# Patient Record
Sex: FEMALE | Race: WHITE | URBAN - METROPOLITAN AREA
[De-identification: names, ages, dates, MRNs, and addresses within clinical notes are randomized per-mention and may not be internally consistent; named-entity substitution may affect disease eponyms.]

---

## 2018-05-16 ENCOUNTER — HISTORICAL (OUTPATIENT)
Dept: ADMINISTRATIVE | Facility: HOSPITAL | Age: 58
End: 2018-05-16

## 2018-05-16 LAB
ABS NEUT (OLG): 6.65 X10(3)/MCL (ref 2.1–9.2)
ALBUMIN SERPL-MCNC: 3.6 GM/DL (ref 3.4–5)
ALBUMIN/GLOB SERPL: 1 RATIO (ref 1–2)
ALP SERPL-CCNC: 112 UNIT/L (ref 45–117)
ALT SERPL-CCNC: 22 UNIT/L (ref 12–78)
APPEARANCE, UA: CLEAR
AST SERPL-CCNC: 17 UNIT/L (ref 15–37)
BACTERIA #/AREA URNS AUTO: ABNORMAL /[HPF]
BASOPHILS # BLD AUTO: 0.05 X10(3)/MCL
BASOPHILS NFR BLD AUTO: 1 %
BILIRUB SERPL-MCNC: 1 MG/DL (ref 0.2–1)
BILIRUB UR QL STRIP: 0.5 MG/DL
BILIRUBIN DIRECT+TOT PNL SERPL-MCNC: 0.3 MG/DL
BILIRUBIN DIRECT+TOT PNL SERPL-MCNC: 0.7 MG/DL
BUN SERPL-MCNC: 16 MG/DL (ref 7–18)
CALCIUM SERPL-MCNC: 8.9 MG/DL (ref 8.5–10.1)
CHLORIDE SERPL-SCNC: 92 MMOL/L (ref 98–107)
CO2 SERPL-SCNC: 30 MMOL/L (ref 21–32)
COLOR UR: YELLOW
CREAT SERPL-MCNC: 1 MG/DL (ref 0.6–1.3)
ERYTHROCYTE [DISTWIDTH] IN BLOOD BY AUTOMATED COUNT: 13.7 % (ref 11.5–14.5)
GLOBULIN SER-MCNC: 5.5 GM/ML (ref 2.3–3.5)
GLUCOSE (UA): 50 MG/DL
GLUCOSE SERPL-MCNC: 233 MG/DL (ref 74–106)
HCT VFR BLD AUTO: 42.4 % (ref 35–46)
HGB BLD-MCNC: 13.7 GM/DL (ref 12–16)
HGB UR QL STRIP: NEGATIVE
HYALINE CASTS #/AREA URNS LPF: 0 /[LPF]
IMM GRANULOCYTES # BLD AUTO: 0.07 10*3/UL
IMM GRANULOCYTES NFR BLD AUTO: 1 %
KETONES UR QL STRIP: 20 MG/DL
LEUKOCYTE ESTERASE UR QL STRIP: NEGATIVE
LYMPHOCYTES # BLD AUTO: 1.39 X10(3)/MCL
LYMPHOCYTES NFR BLD AUTO: 16 % (ref 13–40)
MCH RBC QN AUTO: 27.7 PG (ref 26–34)
MCHC RBC AUTO-ENTMCNC: 32.3 GM/DL (ref 31–37)
MCV RBC AUTO: 85.8 FL (ref 80–100)
MONOCYTES # BLD AUTO: 0.83 X10(3)/MCL
MONOCYTES NFR BLD AUTO: 9 % (ref 4–12)
MUCOUS THREADS URNS QL MICRO: ABNORMAL
NEUTROPHILS # BLD AUTO: 6.65 X10(3)/MCL
NEUTROPHILS NFR BLD AUTO: 74 X10(3)/MCL
NITRITE UR QL STRIP: NEGATIVE
PH UR STRIP: 6 [PH] (ref 4.5–8)
PLATELET # BLD AUTO: 365 X10(3)/MCL (ref 130–400)
PMV BLD AUTO: 9.4 FL (ref 7.4–10.4)
POTASSIUM SERPL-SCNC: 3.9 MMOL/L (ref 3.5–5.1)
PROT SERPL-MCNC: 9.1 GM/DL (ref 6.4–8.2)
PROT UR QL STRIP: 50 MG/DL
RBC # BLD AUTO: 4.94 X10(6)/MCL (ref 4–5.2)
RBC #/AREA URNS AUTO: ABNORMAL /[HPF]
SODIUM SERPL-SCNC: 132 MMOL/L (ref 136–145)
SP GR UR STRIP: 1.04 (ref 1–1.03)
SQUAMOUS #/AREA URNS LPF: ABNORMAL /[LPF]
UROBILINOGEN UR STRIP-ACNC: 3 MG/DL
WBC # SPEC AUTO: 9 X10(3)/MCL (ref 4.5–11)
WBC #/AREA URNS AUTO: ABNORMAL /HPF

## 2018-05-18 LAB — FINAL CULTURE: NORMAL

## 2022-04-11 ENCOUNTER — HISTORICAL (OUTPATIENT)
Dept: ADMINISTRATIVE | Facility: HOSPITAL | Age: 62
End: 2022-04-11

## 2022-04-27 VITALS
DIASTOLIC BLOOD PRESSURE: 85 MMHG | HEIGHT: 67 IN | WEIGHT: 266.56 LBS | SYSTOLIC BLOOD PRESSURE: 137 MMHG | OXYGEN SATURATION: 94 % | BODY MASS INDEX: 41.84 KG/M2

## 2022-05-04 NOTE — HISTORICAL [facility] CERNER
This is a historical note converted from [employer]. Formatting and pictures may have been removed.  Please reference [employer] for original formatting and attached multimedia. Chief Complaint  c/o sore throat, vomiting, nausea, fever, headache, left ear pain, dizziness . symptoms x 4 days. Was seen at [facility] on Monday for same problem.  History of Present Illness  58 yo WF with sore throat, nausea/spitting up, subjective fever, headache, dizziness and left ear pain x 4 days. +?PND; on flonase and loratadine. ?She was seen at [facility]?after hours clinic on Monday; prescribed Amoxicillin and zofran.?States she feels worst today with elevated B/P and blood sugar despite inability to eat solids. She is?crushing her medications in applesauce to take secondary to difficulty swallowing fro throat pain. ??Patient is an insulin dependent diabetic; states blood sugar?was 213 mg/dl this AM. States she can only tolerate ice chips; everything feels like it gets stuck. No diarrhea or abdominal pain; no skin rash. History of RA and ANNAMARIE; patient is visiting from [place].  Review of Systems  GENERAL: Negative except as stated?in HPI  CV: Negative except as stated in HPI  RESP: Negative except as stated?in HPI  GI: Negative?except as stated in?HPI  : Negative?except as stated in?HPI  SKIN: Negative?except as stated in?HPI  Neuro: Negative?except as stated in?HPI  MS: Negative?except as stated in?HPI  Psych: Negative?except as stated in?HPI  Physical Exam  Vitals & Measurements  T:?36.9? ?C (Oral)? HR:?77(Peripheral)? BP:?137/85? SpO2:?94%?  HT:?170?cm? HT:?170?cm? WT:?120.9?kg? WT:?120.9?kg? BMI:?41.83?  Vital Signs reviewed  ENERAL: Awake and alert; no acute distress.  HENT: Normocephalic; + anterior cervical lymphadenopathy (greater on left). Right tympanic membrane normal; no erythema, effusion or perforation. Left TM obscured by cerumen.?  CV: Normal rate and rhythm. No murmur or JVD. No edema.  RESP: Respirations even  and nonlabored. Scattered rhonchi to upper lobes.  NEURO: Awake, alert and oriented. No focal or sensory deficits.  INTEGUMENTARY: Skin warm, dry, and intact. No rashes or?unusual bruising.  PSYCH: Appropriate mood and affect; cooperative.  Assessment/Plan  1.?Acute pharyngitis, unspecified  Rapid strep negative; strep culture pending. CMP with decreased sodium of 132 mmol/l. I recommended transfer to ER for IV fluids and evaluation. Patient declined; states she will return if her condition does not improve or worsens.??Patient encouraged to drink electrolyte replacement (G2 or powerade zero) to replenish sodium tonight. If she is?unable to ingest a sufficient amount she must return to ER. Stop Amoxicillin.  ?  2.?Diabetes mellitus  Serum glucose elevated at ?Monitor Blood sugar closely.  ?  Cough  Chest X-ray PA and Lateral with radiology read of elevated right hemidiaphragm no acute lung abnormality. Mycoplasma pending. I will cover patient with Levaquin 500 mg PO daily. Stop Amoxil. Rocephin 1 gm IM with lidocaine IM now. Albuterol inhaler every 4 hours as needed for cough. MUST present to nearest ER for increased SOB, chest pain, weakness or other worsening in condition. VSS with O2 sat 95% on room air at time of discharge.  Ordered:  Mycoplasma Antibody IgM, Stat collect, 05/16/18 20:45:00 CDT, Blood, Stop date 05/16/18 20:45:00 CDT, Nurse collect, Q fever  Cough, 05/16/18 20:45:00 CDT  XR Chest 2 Views, Stat, 05/16/18 21:08:00 CDT, Cough, cough/fever, None, Ambulatory, Rad Type, Cough  Fever, 05/16/18 21:08:00 CDT  ?  Fever  Rapid strep screen negative; strep culture pending. Flu A/B negative. CBC unremarkable with white count of 9,000.?Mycoplasma IgM, UA pending. Afebrile at time of discharge. Patient must follow up in 24 hours for recheck. TO ER for worsening in condition.  Ordered:  cefTRIAXone, 1 gm, form: Injection, IM, Once, first dose 05/16/18 22:00:00 CDT, stop date 05/16/18 22:00:00 CDT  Lidocaine  inj., 2.1 mL, form: Injection, IM, Once, first dose 05/16/18 21:39:00 CDT, stop date 05/16/18 21:39:00 CDT, Use 2.1 mL to reconstitute a 1 Gram vial of Rocephin/ceftriaxone. Final Concentration = 350 mg/mL  XR Chest 2 Views, Stat, 05/16/18 21:08:00 CDT, Cough, cough/fever, None, Ambulatory, Rad Type, Cough  Fever, 05/16/18 21:08:00 CDT  ?   Problem List/Past Medical History  Ongoing  Diabetes mellitus  Rheumatoid arthritis  Sleep apnea  Historical  No qualifying data  Procedure/Surgical History  marya arches lowered, broken ankle, Hysterectomy, partial toe removal, Tonsillectomy.  Medications  APIDRA SOLOSTAR 100 UNITS/ML  ASPIRIN EC 81 MG TABLET  BASAGLAR 100 UNIT/ML KWIKPEN  CALCIUM 600-VIT D3 400 TABLET, Oral, BID  [employer] FLUTICASONE PROP 50 MCG  DIPHENOXYLATE-ATROP 2.5-0.025, 1 tab(s), Oral, QID  GABAPENTIN 300 MG CAPSULE, 300 mg= 1 cap(s), Oral, TID  Levaquin 500 mg oral tablet, 500 mg= 1 tab(s), Oral, q24hr  LEVOTHYROXINE 150 MCG TABLET, 150 mcg= 1 tab(s), Oral, qAM  LISINOPRIL 2.5 MG TABLET, 2.5 mg= 1 tab(s), Oral, Daily  LORATADINE 10 MG TABLET, 10 mg= 1 tab(s), Oral, Daily  MELOXICAM 15 MG TABLET, 15 mg= 1 tab(s), Oral, Daily  METFORMIN HCL  MG TABLET, 500 mg= 1 tab(s), Oral, BID  MONTEL[place]AST SOD 10 MG TABLET, 10 mg= 1 tab(s), Oral, qPM  PRAVASTATIN SODIUM 10 MG TAB, 10 mg= 1 tab(s), Oral, Daily  Proventil HFA 90 mcg/inh inhalation aerosol with adapter, 2 puff(s), INH, q4hr, PRN  Toradol for IM, 60 mg= 2 mL, IM, Once  TRAMADOL HCL 50 MG TABLET, 50 mg= 1 tab(s), Oral, q6hr  Allergies  Demerol HCl?(itching)  morphine?(streaking-redness)  Social History  Alcohol  Never, 05/16/2018  Substance Abuse  Never, 05/16/2018  Tobacco  Never smoker Use:., 05/16/2018